# Patient Record
Sex: MALE | Race: WHITE | NOT HISPANIC OR LATINO | Employment: FULL TIME | URBAN - METROPOLITAN AREA
[De-identification: names, ages, dates, MRNs, and addresses within clinical notes are randomized per-mention and may not be internally consistent; named-entity substitution may affect disease eponyms.]

---

## 2017-01-05 ENCOUNTER — OFFICE VISIT (OUTPATIENT)
Dept: ORTHOPEDIC SURGERY | Facility: CLINIC | Age: 47
End: 2017-01-05

## 2017-01-05 VITALS — HEIGHT: 73 IN | WEIGHT: 229 LBS | BODY MASS INDEX: 30.35 KG/M2 | TEMPERATURE: 97.7 F

## 2017-01-05 DIAGNOSIS — G89.29 CHRONIC PAIN OF LEFT ANKLE: Primary | ICD-10-CM

## 2017-01-05 DIAGNOSIS — M25.572 CHRONIC PAIN OF LEFT ANKLE: Primary | ICD-10-CM

## 2017-01-05 DIAGNOSIS — M19.079 ARTHRITIS OF ANKLE: ICD-10-CM

## 2017-01-05 DIAGNOSIS — M25.372 ANKLE INSTABILITY, LEFT: ICD-10-CM

## 2017-01-05 PROBLEM — M25.373 ANKLE INSTABILITY: Status: ACTIVE | Noted: 2017-01-05

## 2017-01-05 PROCEDURE — 99204 OFFICE O/P NEW MOD 45 MIN: CPT | Performed by: ORTHOPAEDIC SURGERY

## 2017-01-05 PROCEDURE — 73610 X-RAY EXAM OF ANKLE: CPT | Performed by: ORTHOPAEDIC SURGERY

## 2017-01-05 NOTE — PATIENT INSTRUCTIONS
When you know when test is scheduled, call office immediately to make appt for at least 3 days after test is complete

## 2017-01-05 NOTE — PROGRESS NOTES
"   New Patient Complaint      Patient: Ren Suarez  YOB: 1970 46 y.o. male  Medical Record Number: [id number]    Chief Complaints: My left ankle hurts    History of Present Illness:  One-year history of moderate constant stabbing aching burning pain mainly over the anterolateral aspect of the left ankle some inferolaterally and some along the posterior aspect of the heel.  No specific injury of which she is aware but has lots of sprains growing up playing basketball.  He complains of painful popping with redness hasn't worse with standing and walking and improved some with anti-inflammatories and rest.    Ankle has felt unstable to him.    HPI    Allergies: No Known Allergies    Medications:   No current outpatient prescriptions on file prior to visit.     No current facility-administered medications on file prior to visit.        History reviewed. No pertinent past medical history.  Past Surgical History   Procedure Laterality Date   • Wrist surgery       Social History     Occupational History   • Not on file.     Social History Main Topics   • Smoking status: Never Smoker   • Smokeless tobacco: Never Used   • Alcohol use No   • Drug use: No   • Sexual activity: Not on file      Social History     Social History Narrative   • No narrative on file     History reviewed. No pertinent family history.    Review of Systems: 14 point review of systems performed, positive pertinent findings identified in HPI. All remaining systems negative     Review of Systems      Physical Exam:   Vitals:    01/05/17 1457   Temp: 97.7 °F (36.5 °C)   TempSrc: Temporal Artery    Weight: 229 lb (104 kg)   Height: 73\" (185.4 cm)   Physical Exam   Constitutional: pleasant, well developed   Eyes: sclera non icteric  Hearing : adequate for exam  Cardiovascular: palpable pulses in foot, calf/ thigh NT without sign of DVT  Respiratoy: breathing unlabored   Neurological: grossly sensate to LT throughout LE  Psychiatric: " oriented with normal mood and affect.   Lymphatic: No palpable popliteal lymphadenopathy  Skin: intact throughout leg/foot  Musculoskeletal: Nonantalgic gait.  Neutral standing alignment to the left ankle.  There is some mild varus to the heel.  There is swelling anterolaterally with discomfort palpation over the anterolateral and posterior lateral ankle some along the peroneal tendons with 5 out of 5 eversion strength with some discomfort there is discomfort and some feeling of laxity over the anterolateral ligamentous structures minimal discomfort medially.  Physical Exam  Ortho Exam    Radiology: 3 views left ankle ordered today to evaluate ankle pain and reviewed.  Shows sniffing arthritic change of the anterior half of the tibiotalar joint with a lot of spurring and tilt of the talus and a varus alignment within the mortise.    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  Had a long discussion with him today that I'm not clear as to what is the best treatment option for him.  I  certainly don' t he is ready for fusion.  Will fitted with an ASO to give him some stability and get an MRI to evaluate for any large loose bodies and the status of the anterolateral ligamentous structures.  I will see him back pending those results.  Understands he may need referral to another foot and ankle specialist for another opinion.

## 2017-01-12 ENCOUNTER — TELEPHONE (OUTPATIENT)
Dept: ORTHOPEDIC SURGERY | Facility: CLINIC | Age: 47
End: 2017-01-12